# Patient Record
Sex: MALE | Race: WHITE | NOT HISPANIC OR LATINO | Employment: PART TIME | URBAN - METROPOLITAN AREA
[De-identification: names, ages, dates, MRNs, and addresses within clinical notes are randomized per-mention and may not be internally consistent; named-entity substitution may affect disease eponyms.]

---
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
## 2020-12-27 ENCOUNTER — OFFICE VISIT (OUTPATIENT)
Dept: URGENT CARE | Age: 20
End: 2020-12-27
Payer: OTHER GOVERNMENT

## 2020-12-27 VITALS — HEART RATE: 80 BPM | TEMPERATURE: 97.2 F | OXYGEN SATURATION: 98 % | RESPIRATION RATE: 18 BRPM

## 2020-12-27 DIAGNOSIS — Z20.822 EXPOSURE TO COVID-19 VIRUS: Primary | ICD-10-CM

## 2020-12-27 PROCEDURE — U0003 INFECTIOUS AGENT DETECTION BY NUCLEIC ACID (DNA OR RNA); SEVERE ACUTE RESPIRATORY SYNDROME CORONAVIRUS 2 (SARS-COV-2) (CORONAVIRUS DISEASE [COVID-19]), AMPLIFIED PROBE TECHNIQUE, MAKING USE OF HIGH THROUGHPUT TECHNOLOGIES AS DESCRIBED BY CMS-2020-01-R: HCPCS | Performed by: PHYSICIAN ASSISTANT

## 2020-12-27 PROCEDURE — 99213 OFFICE O/P EST LOW 20 MIN: CPT | Performed by: PHYSICIAN ASSISTANT

## 2020-12-29 LAB — SARS-COV-2 RNA SPEC QL NAA+PROBE: NOT DETECTED

## 2022-10-12 ENCOUNTER — OFFICE VISIT (OUTPATIENT)
Dept: URGENT CARE | Facility: CLINIC | Age: 22
End: 2022-10-12
Payer: COMMERCIAL

## 2022-10-12 VITALS
OXYGEN SATURATION: 100 % | RESPIRATION RATE: 14 BRPM | WEIGHT: 170 LBS | HEIGHT: 67 IN | TEMPERATURE: 99.1 F | BODY MASS INDEX: 26.68 KG/M2 | HEART RATE: 78 BPM

## 2022-10-12 DIAGNOSIS — H67.2 OTITIS MEDIA OF LEFT EAR IN DISEASE CLASSIFIED ELSEWHERE: Primary | ICD-10-CM

## 2022-10-12 PROCEDURE — 99213 OFFICE O/P EST LOW 20 MIN: CPT | Performed by: PHYSICIAN ASSISTANT

## 2022-10-12 RX ORDER — AMOXICILLIN AND CLAVULANATE POTASSIUM 875; 125 MG/1; MG/1
1 TABLET, FILM COATED ORAL EVERY 12 HOURS SCHEDULED
Qty: 20 TABLET | Refills: 0 | Status: SHIPPED | OUTPATIENT
Start: 2022-10-12 | End: 2022-10-22

## 2022-10-12 NOTE — PROGRESS NOTES
[facility]        NAME: Ayala Dyson is a 19 y o  male  : 2000    MRN: [id number]  DATE: 2022  TIME: 6:51 PM    Assessment and Plan   Otitis media of left ear in disease classified elsewhere [H67 2]  1  Otitis media of left ear in disease classified elsewhere  amoxicillin-clavulanate (AUGMENTIN) 875-125 mg per tablet         Patient Instructions     Increase fluids, finish antibiotic  Follow up with PCP in 5-7 days  Chief Complaint     Chief Complaint   Patient presents with   • Earache     Pressure and pain in his left ear- pt reports that he has had this for 2 weeks          History of Present Illness       Earache   There is pain in the left ear  This is a new problem  The current episode started 1 to 4 weeks ago  The problem occurs constantly  The problem has been gradually worsening  The maximum temperature recorded prior to his arrival was 100 4 - 100 9 F  The fever has been present for 3 to 4 days  The pain is moderate  Associated symptoms include hearing loss  Pertinent negatives include no abdominal pain, coughing, diarrhea, ear discharge, headaches, neck pain, rash, rhinorrhea, sore throat or vomiting  He has tried nothing for the symptoms  The treatment provided no relief  Review of Systems   Review of Systems   HENT: Positive for ear pain and hearing loss  Negative for ear discharge, rhinorrhea and sore throat  Respiratory: Negative for cough  Gastrointestinal: Negative for abdominal pain, diarrhea and vomiting  Musculoskeletal: Negative for neck pain  Skin: Negative for rash  Neurological: Negative for headaches  All other systems reviewed and are negative          Current Medications       Current Outpatient Medications:   •  amoxicillin-clavulanate (AUGMENTIN) 875-125 mg per tablet, Take 1 tablet by mouth every 12 (twelve) hours for 10 days, Disp: 20 tablet, Rfl: 0  •  dicyclomine (BENTYL) 20 mg tablet, Take 1 tablet by mouth every 6 (six) hours as needed (pain) (Patient not taking: No sig reported), Disp: 10 tablet, Rfl: 0  •  ondansetron (ZOFRAN-ODT) 4 mg disintegrating tablet, Take 1 tablet by mouth every 8 (eight) hours as needed for nausea (Patient not taking: No sig reported), Disp: 10 tablet, Rfl: 0    Current Allergies     Allergies as of 10/12/2022   • (No Known Allergies)            The following portions of the patient's history were reviewed and updated as appropriate: allergies, current medications, past family history, past medical history, past social history, past surgical history and problem list      Past Medical History:   Diagnosis Date   • Asthma        Past Surgical History:   Procedure Laterality Date   • HERNIA REPAIR         No family history on file  Medications have been verified  Objective   Pulse 78   Temp 99 1 °F (37 3 °C)   Resp 14   Ht 5' 7" (1 702 m)   Wt 77 1 kg (170 lb)   SpO2 100%   BMI 26 63 kg/m²   No LMP for male patient  Physical Exam     Physical Exam  Vitals and nursing note reviewed  Constitutional:       Appearance: Normal appearance  He is normal weight  HENT:      Right Ear: Tympanic membrane, ear canal and external ear normal       Left Ear: Ear canal and external ear normal  Tympanic membrane is injected, scarred, erythematous and bulging  Nose: Nose normal    Cardiovascular:      Rate and Rhythm: Normal rate and regular rhythm  Pulses: Normal pulses  Heart sounds: Normal heart sounds  Pulmonary:      Effort: Pulmonary effort is normal       Breath sounds: Normal breath sounds  Lymphadenopathy:      Cervical: Cervical adenopathy (Left anterior) present  Neurological:      Mental Status: He is alert

## 2022-10-24 ENCOUNTER — OFFICE VISIT (OUTPATIENT)
Dept: URGENT CARE | Facility: CLINIC | Age: 22
End: 2022-10-24
Payer: COMMERCIAL

## 2022-10-24 VITALS
BODY MASS INDEX: 26.37 KG/M2 | HEART RATE: 65 BPM | OXYGEN SATURATION: 100 % | SYSTOLIC BLOOD PRESSURE: 141 MMHG | WEIGHT: 168 LBS | TEMPERATURE: 97.6 F | DIASTOLIC BLOOD PRESSURE: 82 MMHG | HEIGHT: 67 IN | RESPIRATION RATE: 16 BRPM

## 2022-10-24 DIAGNOSIS — H66.012 NON-RECURRENT ACUTE SUPPURATIVE OTITIS MEDIA OF LEFT EAR WITH SPONTANEOUS RUPTURE OF TYMPANIC MEMBRANE: Primary | ICD-10-CM

## 2022-10-24 PROCEDURE — 99213 OFFICE O/P EST LOW 20 MIN: CPT | Performed by: NURSE PRACTITIONER

## 2022-10-24 RX ORDER — OFLOXACIN 3 MG/ML
10 SOLUTION AURICULAR (OTIC) DAILY
Qty: 5 ML | Refills: 0 | Status: SHIPPED | OUTPATIENT
Start: 2022-10-24

## 2022-10-24 RX ORDER — AZITHROMYCIN 250 MG/1
TABLET, FILM COATED ORAL
Qty: 6 TABLET | Refills: 0 | Status: SHIPPED | OUTPATIENT
Start: 2022-10-24 | End: 2022-10-28

## 2022-10-24 NOTE — PROGRESS NOTES
[facility]        NAME: Willie Hogue is a 19 y o  male  : 2000    MRN: [id number]  DATE: 2022  TIME: 7:42 PM    Assessment and Plan   Non-recurrent acute suppurative otitis media of left ear with spontaneous rupture of tympanic membrane [H66 012]  1  Non-recurrent acute suppurative otitis media of left ear with spontaneous rupture of tympanic membrane  ofloxacin (FLOXIN) 0 3 % otic solution    azithromycin (ZITHROMAX) 250 mg tablet         Patient Instructions   Azithromycin daily  For 5 days  Ofloxacin 10 drops to the left ear once a day  Do not get any water in the ear for 1 week   Ibuprofen as needed for pain   Follow up with your PCP as needed    Follow up with PCP in 3-5 days  Proceed to  ER if symptoms worsen  Chief Complaint     Chief Complaint   Patient presents with   • Ear Fullness     States he was seen her 2 weeks ago and given abx for OM  Finished abx  Still having some ear fullness decreased hearing  States he is having drainage enough that his hair near the ear is crusty in the morning  History of Present Illness       Patient is a 19year old male presenting with left ear pain, fullness, and drainage  He was seen here 12 days ago and diagnosed with OM of the left ear  He was prescribed Augmentin that he completed  He reports that during the ABX course the ear started with discharge  Denies fever, chills, sore throat, or cough  Ear Fullness   Associated symptoms include ear discharge and hearing loss (muffled hearing)  Review of Systems   Review of Systems   Constitutional: Negative for activity change, chills and fever  HENT: Positive for ear discharge, ear pain and hearing loss (muffled hearing)  Negative for congestion, sinus pressure and sinus pain            Current Medications       Current Outpatient Medications:   •  azithromycin (ZITHROMAX) 250 mg tablet, Take 2 tablets today then 1 tablet daily x 4 days, Disp: 6 tablet, Rfl: 0  • ofloxacin (FLOXIN) 0 3 % otic solution, Administer 10 drops into the left ear daily, Disp: 5 mL, Rfl: 0  •  dicyclomine (BENTYL) 20 mg tablet, Take 1 tablet by mouth every 6 (six) hours as needed (pain) (Patient not taking: No sig reported), Disp: 10 tablet, Rfl: 0  •  ondansetron (ZOFRAN-ODT) 4 mg disintegrating tablet, Take 1 tablet by mouth every 8 (eight) hours as needed for nausea (Patient not taking: No sig reported), Disp: 10 tablet, Rfl: 0    Current Allergies     Allergies as of 10/24/2022   • (No Known Allergies)            The following portions of the patient's history were reviewed and updated as appropriate: allergies, current medications, past family history, past medical history, past social history, past surgical history and problem list      Past Medical History:   Diagnosis Date   • Asthma        Past Surgical History:   Procedure Laterality Date   • HERNIA REPAIR         No family history on file  Medications have been verified  Objective   /82   Pulse 65   Temp 97 6 °F (36 4 °C) (Temporal)   Resp 16   Ht 5' 7" (1 702 m)   Wt 76 2 kg (168 lb)   SpO2 100%   BMI 26 31 kg/m²        Physical Exam     Physical Exam  Constitutional:       General: He is awake  HENT:      Head: Normocephalic  Right Ear: Hearing, tympanic membrane, ear canal and external ear normal       Left Ear: Ear canal and external ear normal  Decreased hearing noted  Drainage present  Tympanic membrane is erythematous  Cardiovascular:      Rate and Rhythm: Normal rate  Pulmonary:      Effort: Pulmonary effort is normal    Skin:     General: Skin is warm  Neurological:      General: No focal deficit present  Mental Status: He is alert and oriented to person, place, and time  Psychiatric:         Behavior: Behavior is cooperative

## 2022-11-09 ENCOUNTER — OFFICE VISIT (OUTPATIENT)
Dept: URGENT CARE | Facility: CLINIC | Age: 22
End: 2022-11-09

## 2022-11-09 VITALS — OXYGEN SATURATION: 98 % | TEMPERATURE: 98.5 F | RESPIRATION RATE: 16 BRPM | HEART RATE: 76 BPM

## 2022-11-09 DIAGNOSIS — H60.392 OTHER INFECTIVE ACUTE OTITIS EXTERNA OF LEFT EAR: Primary | ICD-10-CM

## 2022-11-09 RX ORDER — OFLOXACIN 3 MG/ML
10 SOLUTION AURICULAR (OTIC) DAILY
Qty: 5 ML | Refills: 0 | Status: SHIPPED | OUTPATIENT
Start: 2022-11-09

## 2022-11-10 NOTE — PROGRESS NOTES
[facility]        NAME: Arina Beck is a 19 y o  male  : 2000    MRN: [id number]  DATE: 2022  TIME: 7:31 PM    Assessment and Plan   Other infective acute otitis externa of left ear [H60 392]  1  Other infective acute otitis externa of left ear  ofloxacin (FLOXIN) 0 3 % otic solution         Patient Instructions       Follow up with PCP in 3-5 days  Proceed to  ER if symptoms worsen  Chief Complaint     Chief Complaint   Patient presents with   • Earache     L ear states he was seen here on 10/12 for infection  Given Augmentin  Finished po abx but still having issues  Returned and given ear drops which he took for 5 or 6 days  Improved so he stopped the drops then sx returned so he took 2 more days of gtts  Last dose on   Still having some discomfort  Also states he was trying to get the water out of his ear after a shower a couple days ago and saw blood  History of Present Illness       Patient is here for evaluation of left ear pain  Patient has been having ongoing problems with the left ear  He did start the antibiotic drops to the ear in use of for 5 days and felt like it was coming back so he use the drops 2 days about 2 days after he stopped them  He still having some discharge and discomfort in the left ear  Patient states he was trying to get some water out of the ear when he noticed some bleeding from the ear  He denies any pop or or pain  Earache   Associated symptoms include ear discharge  Pertinent negatives include no rhinorrhea or sore throat  Review of Systems   Review of Systems   Constitutional: Negative  HENT: Positive for ear discharge and ear pain  Negative for congestion, postnasal drip, rhinorrhea, sinus pressure, sinus pain, sore throat and trouble swallowing  Eyes: Negative  Respiratory: Negative            Current Medications       Current Outpatient Medications:   •  ofloxacin (FLOXIN) 0 3 % otic solution, Administer 10 drops into the left ear daily, Disp: 5 mL, Rfl: 0  •  dicyclomine (BENTYL) 20 mg tablet, Take 1 tablet by mouth every 6 (six) hours as needed (pain) (Patient not taking: No sig reported), Disp: 10 tablet, Rfl: 0  •  ondansetron (ZOFRAN-ODT) 4 mg disintegrating tablet, Take 1 tablet by mouth every 8 (eight) hours as needed for nausea (Patient not taking: No sig reported), Disp: 10 tablet, Rfl: 0    Current Allergies     Allergies as of 11/09/2022   • (No Known Allergies)            The following portions of the patient's history were reviewed and updated as appropriate: allergies, current medications, past family history, past medical history, past social history, past surgical history and problem list      Past Medical History:   Diagnosis Date   • Asthma        Past Surgical History:   Procedure Laterality Date   • HERNIA REPAIR         No family history on file  Medications have been verified  Objective   Pulse 76   Temp 98 5 °F (36 9 °C) (Temporal)   Resp 16   SpO2 98%   No LMP for male patient  Physical Exam     Physical Exam  Vitals and nursing note reviewed  Constitutional:       General: He is not in acute distress  Appearance: Normal appearance  He is well-developed  He is not ill-appearing, toxic-appearing or diaphoretic  HENT:      Head: Normocephalic and atraumatic  Right Ear: Tympanic membrane and ear canal normal       Left Ear: Tympanic membrane normal  Laceration (Small open wound on the top of the ear canal   No active bleeding ), drainage and swelling present  Tympanic membrane is not perforated, erythematous, retracted or bulging  Eyes:      Extraocular Movements: Extraocular movements intact  Conjunctiva/sclera: Conjunctivae normal       Pupils: Pupils are equal, round, and reactive to light  Skin:     General: Skin is warm and dry  Neurological:      General: No focal deficit present        Mental Status: He is alert and oriented to person, place, and time  Psychiatric:         Mood and Affect: Mood normal          Behavior: Behavior normal          Thought Content:  Thought content normal          Judgment: Judgment normal

## 2022-11-10 NOTE — PATIENT INSTRUCTIONS
Continue the ear drops for 10 days    Follow-up with your primary care physician or ENT if symptoms persist    Avoid getting water in the ear while undergoing treatment

## 2022-11-27 ENCOUNTER — OFFICE VISIT (OUTPATIENT)
Dept: URGENT CARE | Facility: CLINIC | Age: 22
End: 2022-11-27

## 2022-11-27 VITALS
OXYGEN SATURATION: 98 % | HEART RATE: 88 BPM | RESPIRATION RATE: 16 BRPM | BODY MASS INDEX: 26.31 KG/M2 | WEIGHT: 168 LBS | TEMPERATURE: 97.5 F

## 2022-11-27 DIAGNOSIS — R06.2 WHEEZING: Primary | ICD-10-CM

## 2022-11-27 RX ORDER — ALBUTEROL SULFATE 90 UG/1
2 AEROSOL, METERED RESPIRATORY (INHALATION) EVERY 6 HOURS PRN
Qty: 18 G | Refills: 0 | Status: SHIPPED | OUTPATIENT
Start: 2022-11-27

## 2022-11-27 NOTE — PROGRESS NOTES
[facility]        NAME: Shial Quiñones is a 19 y o  male  : 2000    MRN: [id number]  DATE: 2022  TIME: 11:09 AM    Assessment and Plan   Wheezing [R06 2]  1  Wheezing  albuterol (Ventolin HFA) 90 mcg/act inhaler            Patient Instructions       Follow up with PCP in 3-5 days  Proceed to  ER if symptoms worsen  Chief Complaint     Chief Complaint   Patient presents with   • Shortness of Breath     Pt is reporting issues with asthma- trouble sleeping and breathing at night with ongoing cough  History of Present Illness       Patient is a 22-year-old male presenting with cough and wheeze for the past 2-3 days  Reports history of asthma but has not had any medication in several years  The cough started on his roommates turned the heat on  No over-the-counter medications  Review of Systems   Review of Systems   Constitutional: Negative for activity change, chills, fatigue and fever  HENT: Negative for congestion, ear pain, rhinorrhea, sinus pain and sore throat  Respiratory: Positive for cough and wheezing  Negative for shortness of breath  Skin: Negative for rash  Neurological: Negative for headaches           Current Medications       Current Outpatient Medications:   •  albuterol (Ventolin HFA) 90 mcg/act inhaler, Inhale 2 puffs every 6 (six) hours as needed for wheezing, Disp: 18 g, Rfl: 0  •  dicyclomine (BENTYL) 20 mg tablet, Take 1 tablet by mouth every 6 (six) hours as needed (pain), Disp: 10 tablet, Rfl: 0  •  ofloxacin (FLOXIN) 0 3 % otic solution, Administer 10 drops into the left ear daily, Disp: 5 mL, Rfl: 0  •  ondansetron (ZOFRAN-ODT) 4 mg disintegrating tablet, Take 1 tablet by mouth every 8 (eight) hours as needed for nausea, Disp: 10 tablet, Rfl: 0    Current Allergies     Allergies as of 2022   • (No Known Allergies)            The following portions of the patient's history were reviewed and updated as appropriate: allergies, current medications, past family history, past medical history, past social history, past surgical history and problem list      Past Medical History:   Diagnosis Date   • Asthma        Past Surgical History:   Procedure Laterality Date   • HERNIA REPAIR         No family history on file  Medications have been verified  Objective   Pulse 88   Temp 97 5 °F (36 4 °C)   Resp 16   Wt 76 2 kg (168 lb)   SpO2 98%   BMI 26 31 kg/m²        Physical Exam     Physical Exam  Vitals reviewed  Constitutional:       General: He is awake  He is not in acute distress  Appearance: Normal appearance  He is normal weight  HENT:      Head: Normocephalic  Right Ear: Hearing normal       Left Ear: Hearing normal       Nose: Nose normal    Cardiovascular:      Rate and Rhythm: Normal rate and regular rhythm  Heart sounds: Normal heart sounds, S1 normal and S2 normal    Pulmonary:      Effort: Pulmonary effort is normal       Breath sounds: Examination of the right-upper field reveals wheezing  Examination of the left-upper field reveals wheezing  Wheezing (end expiratory) present  No decreased breath sounds, rhonchi or rales  Skin:     General: Skin is warm and moist    Neurological:      General: No focal deficit present  Mental Status: He is alert, oriented to person, place, and time and easily aroused  Psychiatric:         Behavior: Behavior is cooperative

## 2023-01-23 ENCOUNTER — OFFICE VISIT (OUTPATIENT)
Dept: URGENT CARE | Facility: CLINIC | Age: 23
End: 2023-01-23

## 2023-01-23 VITALS — RESPIRATION RATE: 16 BRPM | TEMPERATURE: 97.8 F | HEART RATE: 81 BPM | OXYGEN SATURATION: 99 %

## 2023-01-23 DIAGNOSIS — H66.001 ACUTE SUPPURATIVE OTITIS MEDIA OF RIGHT EAR WITHOUT SPONTANEOUS RUPTURE OF TYMPANIC MEMBRANE, RECURRENCE NOT SPECIFIED: Primary | ICD-10-CM

## 2023-01-23 DIAGNOSIS — H61.21 IMPACTED CERUMEN OF RIGHT EAR: ICD-10-CM

## 2023-01-23 DIAGNOSIS — H60.501 ACUTE OTITIS EXTERNA OF RIGHT EAR, UNSPECIFIED TYPE: ICD-10-CM

## 2023-01-23 RX ORDER — CIPROFLOXACIN AND DEXAMETHASONE 3; 1 MG/ML; MG/ML
4 SUSPENSION/ DROPS AURICULAR (OTIC) 2 TIMES DAILY
Qty: 7.5 ML | Refills: 0 | Status: SHIPPED | OUTPATIENT
Start: 2023-01-23

## 2023-01-23 RX ORDER — AMOXICILLIN 875 MG/1
875 TABLET, COATED ORAL 2 TIMES DAILY
Qty: 14 TABLET | Refills: 0 | Status: SHIPPED | OUTPATIENT
Start: 2023-01-23 | End: 2023-01-30

## 2023-01-23 NOTE — PATIENT INSTRUCTIONS
--Take oral antibiotic, instill antibiotic ear drops as prescribed  --Motrin as needed  --Keep water out of ears  --Seek re-evaluation if no improvement/worsening over the next 48-72 hours

## 2023-01-23 NOTE — PROGRESS NOTES
[facility]        NAME: Alyssia Biswas is a 23 y o  male  : 2000    MRN: [id number]  DATE: 2023  TIME: 12:36 PM    Assessment and Plan   Acute suppurative otitis media of right ear without spontaneous rupture of tympanic membrane, recurrence not specified [H66 001]  1  Acute suppurative otitis media of right ear without spontaneous rupture of tympanic membrane, recurrence not specified  amoxicillin (AMOXIL) 875 mg tablet      2  Acute otitis externa of right ear, unspecified type  ciprofloxacin-dexamethasone (CIPRODEX) otic suspension      3  Impacted cerumen of right ear              Patient Instructions     --Take oral antibiotic, instill antibiotic ear drops as prescribed  --Motrin as needed  --Keep water out of ears  --Seek re-evaluation if no improvement/worsening over the next 48-72 hours  Chief Complaint     Chief Complaint   Patient presents with   • Earache      R ear pain started 4 days ago  No other sx  History of Present Illness       Here with girlfriend for complaints of right ear pain, decreased hearing x 4 days  No associated fever, URI symptoms, otorrhea  Had previous ear infection in his left ear  No OTC/home treatments  Review of Systems   Review of Systems   Constitutional: Negative for fever  HENT: Positive for ear pain  Negative for ear discharge and rhinorrhea  Respiratory: Negative for cough  Neurological: Negative for headaches           Current Medications       Current Outpatient Medications:   •  albuterol (Ventolin HFA) 90 mcg/act inhaler, Inhale 2 puffs every 6 (six) hours as needed for wheezing, Disp: 18 g, Rfl: 0  •  amoxicillin (AMOXIL) 875 mg tablet, Take 1 tablet (875 mg total) by mouth 2 (two) times a day for 7 days, Disp: 14 tablet, Rfl: 0  •  ciprofloxacin-dexamethasone (CIPRODEX) otic suspension, Administer 4 drops to the right ear 2 (two) times a day, Disp: 7 5 mL, Rfl: 0  •  dicyclomine (BENTYL) 20 mg tablet, Take 1 tablet by mouth every 6 (six) hours as needed (pain) (Patient not taking: Reported on 1/23/2023), Disp: 10 tablet, Rfl: 0  •  ondansetron (ZOFRAN-ODT) 4 mg disintegrating tablet, Take 1 tablet by mouth every 8 (eight) hours as needed for nausea (Patient not taking: Reported on 1/23/2023), Disp: 10 tablet, Rfl: 0    Current Allergies     Allergies as of 01/23/2023   • (No Known Allergies)            The following portions of the patient's history were reviewed and updated as appropriate: allergies, current medications, past family history, past medical history, past social history, past surgical history and problem list      Past Medical History:   Diagnosis Date   • Asthma        Past Surgical History:   Procedure Laterality Date   • HERNIA REPAIR         No family history on file  Medications have been verified  Objective   Pulse 81   Temp 97 8 °F (36 6 °C) (Temporal)   Resp 16   SpO2 99%   No LMP for male patient  Physical Exam     Physical Exam  Constitutional:       General: He is not in acute distress  Appearance: He is not ill-appearing, toxic-appearing or diaphoretic  HENT:      Right Ear: There is impacted cerumen  Left Ear: Tympanic membrane and ear canal normal  There is no impacted cerumen  Ears:      Comments: Right ear canal 100% occluded distally with cerumen  S/p removal after 5 minutes of warm water lavage, distal canal noted to be erythematous, mildly swollen, TM erythematous with mild bulge  No otorrhea  Mouth/Throat:      Pharynx: No oropharyngeal exudate or posterior oropharyngeal erythema  Pulmonary:      Effort: Pulmonary effort is normal    Lymphadenopathy:      Cervical: No cervical adenopathy  Neurological:      Mental Status: He is alert     Psychiatric:         Mood and Affect: Mood normal          Ear cerumen removal    Date/Time: 1/23/2023 12:40 PM  Performed by: MARLEY Melendrez  Authorized by: MARLEY Melendrez   Universal Protocol:  Consent: Verbal consent obtained  Risks and benefits: risks, benefits and alternatives were discussed  Consent given by: patient  Patient understanding: patient states understanding of the procedure being performed  Patient consent: the patient's understanding of the procedure matches consent given  Procedure consent: procedure consent matches procedure scheduled  Required items: required blood products, implants, devices, and special equipment available  Patient identity confirmed: verbally with patient      Patient location:  Clinic  Indications / Diagnosis:  Pain, decreased hearing  Procedure details:     Location:  R ear    Procedure type: irrigation only      Approach:  Natural orifice    Visualization (free text):  SEE EXAM  Post-procedure details:     Complication:  None    Hearing quality:  Improved    Patient tolerance of procedure:   Tolerated well, no immediate complications

## 2023-02-12 ENCOUNTER — OFFICE VISIT (OUTPATIENT)
Dept: URGENT CARE | Facility: CLINIC | Age: 23
End: 2023-02-12

## 2023-02-12 VITALS
BODY MASS INDEX: 27.31 KG/M2 | WEIGHT: 174 LBS | TEMPERATURE: 98.1 F | RESPIRATION RATE: 16 BRPM | DIASTOLIC BLOOD PRESSURE: 78 MMHG | OXYGEN SATURATION: 99 % | HEIGHT: 67 IN | HEART RATE: 70 BPM | SYSTOLIC BLOOD PRESSURE: 129 MMHG

## 2023-02-12 DIAGNOSIS — H92.01 RIGHT EAR PAIN: Primary | ICD-10-CM

## 2023-02-12 DIAGNOSIS — H61.21 IMPACTED CERUMEN OF RIGHT EAR: ICD-10-CM

## 2023-02-12 NOTE — PATIENT INSTRUCTIONS
--Use Rx ear drops (prescribed previously) for an additional 3-5 days as precaution  --Follow-up with ENT for any ongoing/recurrent issues

## 2023-02-12 NOTE — PROGRESS NOTES
[facility] [facility]        NAME: Kylah Gill is a 23 y o  male  : 2000    MRN: [id number]  DATE: 2023  TIME: 12:04 PM    Assessment and Plan   Right ear pain [H92 01]  1  Right ear pain  Ambulatory Referral to Otolaryngology      2  Impacted cerumen of right ear              Patient Instructions     --Use Rx ear drops (prescribed previously) for an additional 3-5 days as precaution  --Follow-up with ENT for any ongoing/recurrent issues    Chief Complaint     Chief Complaint   Patient presents with   • Earache     Pt  C/o right ear pain on and off x weeks, got worse within the past couple days, throbbing pain  Pt  Using ears drops that were rx'd  History of Present Illness       Here with complaints of right ear pain, decreased hearing for a couple days  No fever, cold symptoms  Seen here on  for right ear pain, at which time wax was removed from ear and he was treated with oral antibiotic and antibiotic ear drops  Completed both, as instructed  No known trauma  No otorrhea  Review of Systems   Review of Systems   Constitutional: Negative for fever  HENT: Positive for ear pain and hearing loss  Negative for ear discharge and rhinorrhea  Respiratory: Negative for cough            Current Medications       Current Outpatient Medications:   •  ciprofloxacin-dexamethasone (CIPRODEX) otic suspension, Administer 4 drops to the right ear 2 (two) times a day, Disp: 7 5 mL, Rfl: 0  •  albuterol (Ventolin HFA) 90 mcg/act inhaler, Inhale 2 puffs every 6 (six) hours as needed for wheezing, Disp: 18 g, Rfl: 0  •  dicyclomine (BENTYL) 20 mg tablet, Take 1 tablet by mouth every 6 (six) hours as needed (pain) (Patient not taking: Reported on 2023), Disp: 10 tablet, Rfl: 0  •  ondansetron (ZOFRAN-ODT) 4 mg disintegrating tablet, Take 1 tablet by mouth every 8 (eight) hours as needed for nausea (Patient not taking: Reported on 2023), Disp: 10 tablet, Rfl: 0    Current Allergies     Allergies as of 02/12/2023   • (No Known Allergies)            The following portions of the patient's history were reviewed and updated as appropriate: allergies, current medications, past family history, past medical history, past social history, past surgical history and problem list      Past Medical History:   Diagnosis Date   • Asthma        Past Surgical History:   Procedure Laterality Date   • HERNIA REPAIR         No family history on file  Medications have been verified  Objective   /78   Pulse 70   Temp 98 1 °F (36 7 °C)   Resp 16   Ht 5' 7" (1 702 m)   Wt 78 9 kg (174 lb)   SpO2 99%   BMI 27 25 kg/m²   No LMP for male patient  Physical Exam     Physical Exam  HENT:      Right Ear: Tympanic membrane normal  There is impacted cerumen  Left Ear: Tympanic membrane and ear canal normal  There is no impacted cerumen  Ears:      Comments: Right canal with mild erythema, no swelling or otorrhea  TM occluded with thin layer of cerumen which was fully removed after 5 minutes of warm water lavage which patient tolerated adequately  S/p removal, TM noted to be dull grey with minimal bulge  Pulmonary:      Effort: Pulmonary effort is normal    Neurological:      Mental Status: He is alert  Psychiatric:         Mood and Affect: Mood normal        Ear cerumen removal    Date/Time: 2/12/2023 12:08 PM  Performed by: MARLEY Jones  Authorized by: MARLEY Jones   Universal Protocol:  Consent: Verbal consent obtained    Risks and benefits: risks, benefits and alternatives were discussed  Consent given by: patient  Patient understanding: patient states understanding of the procedure being performed  Patient consent: the patient's understanding of the procedure matches consent given  Procedure consent: procedure consent matches procedure scheduled  Patient identity confirmed: verbally with patient      Patient location:  Clinic  Indications / Diagnosis:  Pain, decreased hearing  Procedure details:     Location:  R ear    Procedure type: irrigation only      Approach:  Natural orifice    Visualization (free text):  SEE EXAM  Post-procedure details:     Complication:  None    Hearing quality:  Improved    Patient tolerance of procedure:   Tolerated well, no immediate complications

## 2023-03-09 ENCOUNTER — OFFICE VISIT (OUTPATIENT)
Dept: FAMILY MEDICINE CLINIC | Facility: CLINIC | Age: 23
End: 2023-03-09

## 2023-03-09 VITALS
HEART RATE: 75 BPM | WEIGHT: 178 LBS | OXYGEN SATURATION: 99 % | SYSTOLIC BLOOD PRESSURE: 120 MMHG | HEIGHT: 67 IN | BODY MASS INDEX: 27.94 KG/M2 | DIASTOLIC BLOOD PRESSURE: 80 MMHG

## 2023-03-09 DIAGNOSIS — R06.2 WHEEZING: ICD-10-CM

## 2023-03-09 DIAGNOSIS — Z13.0 SCREENING FOR DEFICIENCY ANEMIA: ICD-10-CM

## 2023-03-09 DIAGNOSIS — K50.00 CROHN'S DISEASE OF ILEUM WITHOUT COMPLICATION (HCC): ICD-10-CM

## 2023-03-09 DIAGNOSIS — Z13.1 SCREENING FOR DIABETES MELLITUS: ICD-10-CM

## 2023-03-09 DIAGNOSIS — Z13.220 SCREENING CHOLESTEROL LEVEL: ICD-10-CM

## 2023-03-09 DIAGNOSIS — Z00.00 ENCOUNTER FOR PREVENTATIVE ADULT HEALTH CARE EXAMINATION: Primary | ICD-10-CM

## 2023-03-09 DIAGNOSIS — F90.2 ATTENTION DEFICIT HYPERACTIVITY DISORDER (ADHD), COMBINED TYPE: ICD-10-CM

## 2023-03-09 PROBLEM — K43.9 EPIGASTRIC HERNIA: Status: ACTIVE | Noted: 2018-03-16

## 2023-03-09 PROBLEM — M41.9 SCOLIOSIS OF THORACIC SPINE: Status: ACTIVE | Noted: 2017-04-17

## 2023-03-09 RX ORDER — ALBUTEROL SULFATE 90 UG/1
2 AEROSOL, METERED RESPIRATORY (INHALATION) EVERY 6 HOURS PRN
Qty: 18 G | Refills: 1 | Status: SHIPPED | OUTPATIENT
Start: 2023-03-09

## 2023-03-09 NOTE — PROGRESS NOTES
New Patient Outpatient Note    HPI:     Floyd Haile, 23 y o  male  presents today as a new patient and to establish care  Patient is interested in a refill for his albuterol  He has mild intermittent asthma and only uses the albuterol as needed for bronchitis-like symptoms/wheezing  He typically is triggered by dry heat which over the winter has increased the frequency of use  He was previously on montelukast, but has not had medication for over the past several years  Patient does admit to history of Crohn's disease and suspected ADHD symptoms  He is not on any current medication or maintenance medications for either  He does admit as well as his significant other who comes with him today that he can sometimes be hyper focused on a particular project for hours or have very fleeting thoughts and be distracted  Lastly he is interested in having his ears reevaluated  He had a positive ear infection about a month ago, and has mild fullness on the right side still  He has mild decreased hearing on right side      Family Hx  UTD in chart    Past Medical History:   Diagnosis Date   • Asthma         Past Surgical History:   Procedure Laterality Date   • HERNIA REPAIR            Current Outpatient Medications:   •  albuterol (Ventolin HFA) 90 mcg/act inhaler, Inhale 2 puffs every 6 (six) hours as needed for wheezing, Disp: 18 g, Rfl: 1  •  ciprofloxacin-dexamethasone (CIPRODEX) otic suspension, Administer 4 drops to the right ear 2 (two) times a day, Disp: 7 5 mL, Rfl: 0     SOCIAL:   Rent/Own: Renting  Currently living with: 2 roomates  Stable food: Yes  Safe At Home: Yes  Hobbies:  Fishing, video games  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  Restriction to medical procedures: None    SEXUAL HISTORY:   Preference: Women  Sexually Active:  Yes  Birth Control:  OCPs    Psychological History  Psychiatric history: ADHD, anxiety  History of inpatient:  None  Current Therapy/ Provider: none  Current Medications: None    Substance History  Smoking: Vaping currently  Alcohol Use: former, no longer acitve  Substance use:  None    ROS:   Review of Systems   Constitutional: Negative for chills, fever and unexpected weight change  Occasional night sweats   HENT: Positive for congestion, ear pain, postnasal drip and rhinorrhea  Negative for ear discharge, sinus pressure, sinus pain and sore throat  Eyes: Positive for visual disturbance (history of glasses)  Respiratory: Positive for cough (vaping) and shortness of breath ( asthma)  Negative for chest tightness  Cardiovascular: Negative for chest pain and palpitations  Gastrointestinal: Negative for abdominal pain, constipation, diarrhea, nausea and vomiting  Genitourinary: Negative for dysuria and frequency  Skin: Negative for rash and wound  Neurological: Negative for dizziness, light-headedness and headaches  Psychiatric/Behavioral: Negative for self-injury and suicidal ideas  OBJECTIVE  Vitals:    03/09/23 1256   BP: 120/80   Pulse: 75   SpO2: 99%      Physical Exam  Constitutional:       General: He is not in acute distress  Appearance: Normal appearance  He is not ill-appearing, toxic-appearing or diaphoretic  HENT:      Head: Normocephalic and atraumatic  Right Ear: Tympanic membrane, ear canal and external ear normal  There is no impacted cerumen  Left Ear: Tympanic membrane, ear canal and external ear normal  There is no impacted cerumen  Nose: Nose normal  No congestion or rhinorrhea  Mouth/Throat:      Mouth: Mucous membranes are moist       Pharynx: Oropharynx is clear  No oropharyngeal exudate or posterior oropharyngeal erythema  Eyes:      General:         Right eye: No discharge  Left eye: No discharge  Extraocular Movements: Extraocular movements intact  Pupils: Pupils are equal, round, and reactive to light     Cardiovascular:      Rate and Rhythm: Normal rate and regular rhythm  Heart sounds: Normal heart sounds  No murmur heard  No friction rub  No gallop  Pulmonary:      Effort: Pulmonary effort is normal  No respiratory distress  Breath sounds: Normal breath sounds  No stridor  No wheezing, rhonchi or rales  Abdominal:      General: Bowel sounds are normal  There is no distension  Palpations: Abdomen is soft  Tenderness: There is no abdominal tenderness  Musculoskeletal:      Cervical back: Neck supple  No tenderness  Lymphadenopathy:      Cervical: No cervical adenopathy  Skin:     General: Skin is warm  Capillary Refill: Capillary refill takes less than 2 seconds  Neurological:      Mental Status: He is alert  Sensory: No sensory deficit ( Light touch present in all 4 extremities)  Motor: No weakness ( 5/5 in all 4 extremities)  Deep Tendon Reflexes: Reflexes normal ( 2/4, intact, C5, C6, L4, S1)  ASSESSMENT AND PLAN   Teri Bradford was seen today for establish care  Diagnoses and all orders for this visit:    Encounter for preventative adult health care examination  Screening for deficiency anemia  Screening for diabetes mellitus  Screening cholesterol level  Patient has not been evalujated in many years  Will obtain baseline labs for treatment or comparison of labs in the future  Overall the patient's vitals are stable and his BMI is likely slightly lower due to muscular build  Will evaluate cholesterol, kidney/liver function, glucose, and blood counts  -     CBC and differential; Future  -     Comprehensive metabolic panel; Future  -     Lipid panel; Future    Wheezing  Patient has dry heat induced asthma and can sometimes have exacerbations with increased exertion  Refill of albuterol sent to pharmacy at patient request    -     albuterol (Ventolin HFA) 90 mcg/act inhaler; Inhale 2 puffs every 6 (six) hours as needed for wheezing    [facility] ([facility] )  History of crohn's disease  Recommended follow up at some point with GI  Patient and significant other opted to wait on referral and follow up in a few months  Attention deficit hyperactivity disorder (ADHD), combined type  Offered Wellbutrin and consideration for referral to psychiatry  I informed him that I will unlikely be starting ADHD medication, but if he would like further diagnostic clarity, I would place referral   He would like to hold off on treatment or referral at this time                Tricia Stevenson DO  [facility]  3/10/2023 7:49 AM

## 2023-08-17 DIAGNOSIS — R06.2 WHEEZING: ICD-10-CM

## 2023-08-17 RX ORDER — ALBUTEROL SULFATE 90 UG/1
2 AEROSOL, METERED RESPIRATORY (INHALATION) EVERY 6 HOURS PRN
Qty: 18 G | Refills: 0 | Status: SHIPPED | OUTPATIENT
Start: 2023-08-17

## 2023-09-15 DIAGNOSIS — R06.2 WHEEZING: ICD-10-CM

## 2023-09-15 RX ORDER — ALBUTEROL SULFATE 90 UG/1
2 AEROSOL, METERED RESPIRATORY (INHALATION) EVERY 6 HOURS PRN
Qty: 18 G | Refills: 0 | Status: SHIPPED | OUTPATIENT
Start: 2023-09-15

## 2023-09-18 ENCOUNTER — PATIENT MESSAGE (OUTPATIENT)
Dept: FAMILY MEDICINE CLINIC | Facility: CLINIC | Age: 23
End: 2023-09-18

## 2023-09-18 DIAGNOSIS — J45.21 MILD INTERMITTENT ASTHMA WITH ACUTE EXACERBATION: ICD-10-CM

## 2023-09-18 DIAGNOSIS — R06.2 WHEEZING: Primary | ICD-10-CM

## 2023-09-19 RX ORDER — MONTELUKAST SODIUM 10 MG/1
10 TABLET ORAL
Qty: 30 TABLET | Refills: 0 | Status: SHIPPED | OUTPATIENT
Start: 2023-09-19

## 2023-10-09 ENCOUNTER — PATIENT MESSAGE (OUTPATIENT)
Dept: FAMILY MEDICINE CLINIC | Facility: CLINIC | Age: 23
End: 2023-10-09

## 2023-10-09 DIAGNOSIS — J45.21 MILD INTERMITTENT ASTHMA WITH ACUTE EXACERBATION: ICD-10-CM

## 2023-10-09 DIAGNOSIS — R06.2 WHEEZING: ICD-10-CM

## 2023-10-12 RX ORDER — MONTELUKAST SODIUM 10 MG/1
10 TABLET ORAL
Qty: 90 TABLET | Refills: 0 | Status: SHIPPED | OUTPATIENT
Start: 2023-10-12

## 2023-11-12 DIAGNOSIS — R06.2 WHEEZING: ICD-10-CM

## 2023-11-13 RX ORDER — ALBUTEROL SULFATE 90 UG/1
2 AEROSOL, METERED RESPIRATORY (INHALATION) EVERY 6 HOURS PRN
Qty: 18 G | Refills: 1 | Status: SHIPPED | OUTPATIENT
Start: 2023-11-13

## 2024-01-08 DIAGNOSIS — J45.21 MILD INTERMITTENT ASTHMA WITH ACUTE EXACERBATION: ICD-10-CM

## 2024-01-08 DIAGNOSIS — R06.2 WHEEZING: ICD-10-CM

## 2024-01-08 RX ORDER — MONTELUKAST SODIUM 10 MG/1
10 TABLET ORAL
Qty: 90 TABLET | Refills: 1 | Status: SHIPPED | OUTPATIENT
Start: 2024-01-08

## 2024-02-12 DIAGNOSIS — R06.2 WHEEZING: ICD-10-CM

## 2024-02-12 RX ORDER — ALBUTEROL SULFATE 90 UG/1
2 AEROSOL, METERED RESPIRATORY (INHALATION) EVERY 6 HOURS PRN
Qty: 18 G | Refills: 0 | Status: SHIPPED | OUTPATIENT
Start: 2024-02-12

## 2024-03-31 DIAGNOSIS — R06.2 WHEEZING: ICD-10-CM

## 2024-04-02 RX ORDER — ALBUTEROL SULFATE 90 UG/1
2 AEROSOL, METERED RESPIRATORY (INHALATION) EVERY 6 HOURS PRN
Qty: 18 G | Refills: 0 | Status: SHIPPED | OUTPATIENT
Start: 2024-04-02

## 2024-04-02 NOTE — TELEPHONE ENCOUNTER
Patient recently obtained albuterol inhaler about 1 month ago.  I will send another refill just in case he needs it in 2 locations if he requests this again especially within the next 3 months, I will need him to return to the office since he is using albuterol too frequently.

## 2024-04-22 DIAGNOSIS — R06.2 WHEEZING: ICD-10-CM

## 2024-04-22 RX ORDER — ALBUTEROL SULFATE 90 UG/1
2 AEROSOL, METERED RESPIRATORY (INHALATION) EVERY 6 HOURS PRN
Qty: 6.7 G | Refills: 0 | Status: SHIPPED | OUTPATIENT
Start: 2024-04-22

## 2024-06-07 ENCOUNTER — OFFICE VISIT (OUTPATIENT)
Dept: FAMILY MEDICINE CLINIC | Facility: CLINIC | Age: 24
End: 2024-06-07

## 2024-06-07 VITALS
DIASTOLIC BLOOD PRESSURE: 72 MMHG | SYSTOLIC BLOOD PRESSURE: 110 MMHG | BODY MASS INDEX: 28.72 KG/M2 | WEIGHT: 183 LBS | HEART RATE: 66 BPM | HEIGHT: 67 IN | OXYGEN SATURATION: 98 %

## 2024-06-07 DIAGNOSIS — Z13.1 SCREENING FOR DIABETES MELLITUS: ICD-10-CM

## 2024-06-07 DIAGNOSIS — R20.9 COLD EXTREMITIES: ICD-10-CM

## 2024-06-07 DIAGNOSIS — Z00.00 ENCOUNTER FOR PREVENTATIVE ADULT HEALTH CARE EXAMINATION: Primary | ICD-10-CM

## 2024-06-07 DIAGNOSIS — K50.00 CROHN'S DISEASE OF ILEUM WITHOUT COMPLICATION (HCC): ICD-10-CM

## 2024-06-07 DIAGNOSIS — Z13.0 SCREENING FOR DEFICIENCY ANEMIA: ICD-10-CM

## 2024-06-07 DIAGNOSIS — J45.20 MILD INTERMITTENT ASTHMA WITHOUT COMPLICATION: ICD-10-CM

## 2024-06-07 DIAGNOSIS — Z13.29 SCREENING FOR THYROID DISORDER: ICD-10-CM

## 2024-06-07 DIAGNOSIS — Z13.220 SCREENING CHOLESTEROL LEVEL: ICD-10-CM

## 2024-06-07 NOTE — PROGRESS NOTES
Adult Annual Physical  Name: Diaz Albert      : 2000      MRN: [id number]  Encounter Provider: Tano Anthony DO  Encounter Date: 2024   Encounter department: [facility]    Assessment & Plan     Encounter for preventative adult health care examination  Screening for deficiency anemia  Screening for diabetes mellitus  Screening cholesterol level  Screening for thyroid disorder  Patient presents today for follow-up physical examination.  Will obtain labs to review cholesterol, kidney function, liver function, electrolytes, fasting sugar, thyroid function, and cell lines.  -     CBC and differential; Future; Expected date: 2024  -     Comprehensive metabolic panel; Future; Expected date: 2024  -     TSH, 3rd generation with Free T4 reflex; Future; Expected date: 2024  -     Lipid panel; Future; Expected date: 2024    Crohn's disease of ileum without complication (HCC)  Patient has history of Crohn's disease.  He is well aware of the triggers that cause an exacerbation of symptoms including lactose and eggs.  He has been avoiding these and has thankfully avoided any flares.  He understands that if he does have any complications that follow-up with GI would be recommended.    Mild intermittent asthma without complication  History of mild intermittent asthma.  Currently he is not in any exacerbation, and after quitting vaping this has greatly improved his symptoms.  He has not been using the albuterol as frequently as he was previously, he still has 2 albuterol at home.  If these are used up within 6 months, consider maintenance medication, he was previously on Flovent or Advair.    Cold extremities  Patient has a more recent history of cold extremities.  Will review labs for anemia, electrolyte problems, liver function, kidney function, and thyroid to ensure there is no underlying organic causes for cold extremities.  It may be large doses of caffeine  that might be causing some vasoconstriction and cold extremities.  Patient does have risk factors of previous smoking, and will review for her hypercholesterolemia that may be contributing to vascular complications.  -     CBC and differential; Future; Expected date: 06/07/2024  -     Comprehensive metabolic panel; Future; Expected date: 06/07/2024  -     TSH, 3rd generation with Free T4 reflex; Future; Expected date: 06/07/2024    Immunizations and preventive care screenings were discussed with patient today. Appropriate education was printed on patient's after visit summary.    Counseling:  Alcohol/drug use: discussed moderation in alcohol intake, the recommendations for healthy alcohol use, and avoidance of illicit drug use.  Dental Health: discussed importance of regular tooth brushing, flossing, and dental visits.  Sexual health: discussed sexually transmitted diseases, partner selection, use of condoms, avoidance of unintended pregnancy, and contraceptive alternatives.  Exercise: the importance of regular exercise/physical activity was discussed. Recommend exercise 3-5 times per week for at least 30 minutes.       Depression Screening and Follow-up Plan: Patient was screened for depression during today's encounter. They screened negative with a PHQ-2 score of 0.    Tobacco Cessation Counseling: Tobacco cessation counseling was provided. The patient is sincerely urged to quit consumption of tobacco. He is ready to quit tobacco. Patient is no longer smoking or vaping.  He quit about 1 to 2 years ago with tobacco, recently about 2 or 3 months ago he quit vaping.      History of Present Illness     Adult Annual Physical:  Patient presents for annual physical. Patient presents today to follow-up for physical examination.  He has a longstanding history of Crohn's disease and mild intermittent asthma.  Thankfully he has reduced his vaping and has quit over the last several months, and he does feel that his breathing  is better and he is using the albuterol less.  He does admit to using it about 1-2 times per week.  He remains on Singulair to help with seasonal allergies and asthma.  Patient has had minimal flares for his Crohn's since he has established the foods that typically cause him to flare including lactose and eggs.  He will occasionally eat these in no that there is likely a flare coming, but overall he has not required any follow-up with the GI doctor recently..     Diet and Physical Activity:  - Diet/Nutrition: well balanced diet.  - Exercise: walking. Occasionally will walk    Depression Screening:  - PHQ-2 Score: 0    General Health:  - Sleep: sleeps well and 4-6 hours of sleep on average.  - Hearing: normal hearing bilateral ears.  - Vision: no vision problems.  - Dental: regular dental visits.     Health:  - History of STDs: no.   - Urinary symptoms: none.     Review of Systems   Constitutional:  Negative for chills, fever and unexpected weight change.   HENT:  Negative for congestion, ear discharge, ear pain, hearing loss, postnasal drip, rhinorrhea, sinus pressure, sinus pain and sore throat.    Eyes:  Negative for visual disturbance.   Respiratory:  Positive for chest tightness (intemrittent symptoms with asthma). Negative for cough, shortness of breath and wheezing.    Cardiovascular:  Negative for chest pain and palpitations.   Gastrointestinal:  Positive for constipation (w/ IBS/ Crohns). Negative for abdominal pain, diarrhea, nausea and vomiting.   Genitourinary:  Negative for dysuria and frequency.   Skin:  Negative for rash and wound.   Neurological:  Negative for dizziness, light-headedness and headaches.   Psychiatric/Behavioral:  Negative for self-injury and suicidal ideas.      Medical History Reviewed by provider this encounter:  Tobacco  Allergies  Problems  Surg Hx  Soc Hx      Current Outpatient Medications on File Prior to Visit   Medication Sig Dispense Refill    albuterol (PROVENTIL  "HFA,VENTOLIN HFA) 90 mcg/act inhaler INHALE 2 PUFFS BY MOUTH EVERY 6 HOURS AS NEEDED FOR WHEEZING 6.7 g 0    montelukast (SINGULAIR) 10 mg tablet TAKE 1 TABLET BY MOUTH ONCE DAILY AT BEDTIME 90 tablet 1    [DISCONTINUED] ciprofloxacin-dexamethasone (CIPRODEX) otic suspension Administer 4 drops to the right ear 2 (two) times a day 7.5 mL 0     No current facility-administered medications on file prior to visit.      Social History     Tobacco Use    Smoking status: Former     Types: Cigarettes    Smokeless tobacco: Not on file    Tobacco comments:     1-2 years, 1 ppd    Vaping Use    Vaping status: Former    Substances: Nicotine   Substance and Sexual Activity    Alcohol use: No    Drug use: Not Currently     Types: Marijuana    Sexual activity: Yes     Partners: Female     Birth control/protection: OCP       Objective     /72   Pulse 66   Ht 5' 7\" (1.702 m)   Wt 83 kg (183 lb)   SpO2 98%   BMI 28.66 kg/m²     Physical Exam  Constitutional:       General: He is not in acute distress.     Appearance: Normal appearance. He is normal weight. He is not ill-appearing, toxic-appearing or diaphoretic.   HENT:      Head: Normocephalic and atraumatic.      Right Ear: Tympanic membrane, ear canal and external ear normal. There is no impacted cerumen.      Left Ear: Tympanic membrane, ear canal and external ear normal. There is no impacted cerumen.      Nose: Nose normal. No congestion or rhinorrhea.      Mouth/Throat:      Mouth: Mucous membranes are moist.      Pharynx: Oropharynx is clear. No oropharyngeal exudate or posterior oropharyngeal erythema.   Eyes:      General:         Right eye: No discharge.         Left eye: No discharge.      Pupils: Pupils are equal, round, and reactive to light.   Cardiovascular:      Rate and Rhythm: Normal rate and regular rhythm.      Heart sounds: Normal heart sounds. No murmur heard.     No friction rub. No gallop.   Pulmonary:      Effort: Pulmonary effort is normal. No " respiratory distress.      Breath sounds: Normal breath sounds. No stridor. No wheezing, rhonchi or rales.   Abdominal:      General: Bowel sounds are normal. There is no distension.      Palpations: Abdomen is soft.      Tenderness: There is no abdominal tenderness.   Musculoskeletal:      Cervical back: Neck supple. No tenderness.   Lymphadenopathy:      Cervical: No cervical adenopathy.   Skin:     General: Skin is warm.      Capillary Refill: Capillary refill takes less than 2 seconds.   Neurological:      Mental Status: He is alert.      Sensory: No sensory deficit (Light touch present in all 4 extremities).      Motor: No weakness (5/5 in all 4 extremities).      Deep Tendon Reflexes: Reflexes abnormal (Mild reduction in reflexes in lower extremities.  Upper extremities within normal limits at 2/4.  Lower extremities in the S1 and L4 reflexes 1/4.).

## 2024-06-27 DIAGNOSIS — R06.2 WHEEZING: ICD-10-CM

## 2024-06-27 DIAGNOSIS — J45.21 MILD INTERMITTENT ASTHMA WITH ACUTE EXACERBATION: ICD-10-CM

## 2024-06-27 RX ORDER — MONTELUKAST SODIUM 10 MG/1
10 TABLET ORAL
Qty: 90 TABLET | Refills: 1 | Status: SHIPPED | OUTPATIENT
Start: 2024-06-27

## 2024-10-25 ENCOUNTER — OFFICE VISIT (OUTPATIENT)
Dept: URGENT CARE | Facility: CLINIC | Age: 24
End: 2024-10-25
Payer: COMMERCIAL

## 2024-10-25 VITALS
DIASTOLIC BLOOD PRESSURE: 69 MMHG | SYSTOLIC BLOOD PRESSURE: 129 MMHG | TEMPERATURE: 98.5 F | BODY MASS INDEX: 26.68 KG/M2 | OXYGEN SATURATION: 99 % | WEIGHT: 170 LBS | RESPIRATION RATE: 16 BRPM | HEIGHT: 67 IN | HEART RATE: 78 BPM

## 2024-10-25 DIAGNOSIS — J02.9 ACUTE PHARYNGITIS, UNSPECIFIED ETIOLOGY: Primary | ICD-10-CM

## 2024-10-25 PROCEDURE — S9083 URGENT CARE CENTER GLOBAL: HCPCS | Performed by: NURSE PRACTITIONER

## 2024-10-25 PROCEDURE — 87070 CULTURE OTHR SPECIMN AEROBIC: CPT | Performed by: NURSE PRACTITIONER

## 2024-10-25 PROCEDURE — G0382 LEV 3 HOSP TYPE B ED VISIT: HCPCS | Performed by: NURSE PRACTITIONER

## 2024-10-25 PROCEDURE — 87880 STREP A ASSAY W/OPTIC: CPT | Performed by: NURSE PRACTITIONER

## 2024-10-25 NOTE — PROGRESS NOTES
"  Assessment/Plan    Acute pharyngitis, unspecified etiology [J02.9]  1. Acute pharyngitis, unspecified etiology  POCT rapid ANTIGEN strepA    Throat culture    Throat culture        Centor score was 1  Rapid strep was negative, throat culture pending send out   Increase dosing of OTC NSAIDs to q6-8hrs for pain, can alternate between NSAIDs and tylenol if needed   Trial OTC throat lozenges   Salt-water gargles PRN   F/u with PCP within 3-5 days  Proceed to ER should symptoms worsen      Patient ID: Diaz Albert is a 23 y.o. male.      Reason For Visit / Chief Complaint  Chief Complaint   Patient presents with    Sore Throat     Pt reports that he noticed on Monday if felt like he swallows a pills and his throat is scratchy. He reports that if he doesn't swallow he feels better.          22 y/o male presents for throat pain since Monday which has slightly improved but he still has a difficult time swallowing. He denies chills, fevers, headache, nausea, vomiting or rash. Recent exposure to girlfriends mom who was sick. Patient has been taking 400 mg of motrin HS.           Past Medical History:   Diagnosis Date    Asthma        Past Surgical History:   Procedure Laterality Date    HERNIA REPAIR         Family History   Problem Relation Age of Onset    Prostate cancer Maternal Uncle        Review of Systems   Constitutional: Negative.    HENT:  Positive for sore throat and trouble swallowing.    Eyes: Negative.    Respiratory: Negative.     Cardiovascular: Negative.    Gastrointestinal: Negative.    Endocrine: Negative.    Genitourinary: Negative.    Musculoskeletal: Negative.    Skin: Negative.    Allergic/Immunologic: Negative.    Neurological: Negative.    Hematological: Negative.    Psychiatric/Behavioral: Negative.         Objective:    /69   Pulse 78   Temp 98.5 °F (36.9 °C)   Resp 16   Ht 5' 7\" (1.702 m)   Wt 77.1 kg (170 lb)   SpO2 99%   BMI 26.63 kg/m²     Physical Exam  Constitutional:       " General: He is not in acute distress.     Appearance: He is well-developed. He is not toxic-appearing.   HENT:      Head: Normocephalic and atraumatic.      Right Ear: Tympanic membrane and ear canal normal. Tympanic membrane is not erythematous.      Left Ear: Tympanic membrane and ear canal normal. Tympanic membrane is not erythematous.      Nose: No congestion or rhinorrhea.      Mouth/Throat:      Mouth: Mucous membranes are moist.      Pharynx: Oropharynx is clear. Uvula midline. Posterior oropharyngeal erythema present. No pharyngeal swelling, oropharyngeal exudate or uvula swelling.      Tonsils: 1+ on the right. 1+ on the left.   Eyes:      Conjunctiva/sclera: Conjunctivae normal.   Cardiovascular:      Rate and Rhythm: Normal rate and regular rhythm.      Heart sounds: Normal heart sounds.   Pulmonary:      Effort: Pulmonary effort is normal.   Abdominal:      Palpations: Abdomen is soft.   Musculoskeletal:      Cervical back: Normal range of motion.   Skin:     General: Skin is warm and dry.      Capillary Refill: Capillary refill takes less than 2 seconds.   Neurological:      General: No focal deficit present.      Mental Status: He is alert and oriented to person, place, and time.   Psychiatric:         Mood and Affect: Mood normal.         Behavior: Behavior normal.

## 2024-10-25 NOTE — PATIENT INSTRUCTIONS
Centor score was 1  Rapid strep was negative, throat culture pending send out   Increase dosing of OTC NSAIDs to q6-8hrs for pain, can alternate between NSAIDs and tylenol if needed   Trial OTC throat lozenges   Salt-water gargles PRN   F/u with PCP within 3-5 days  Proceed to ER should symptoms worsen

## 2024-10-27 LAB — BACTERIA THROAT CULT: NORMAL

## 2024-10-28 LAB — BACTERIA THROAT CULT: NORMAL

## 2024-11-06 ENCOUNTER — OFFICE VISIT (OUTPATIENT)
Dept: FAMILY MEDICINE CLINIC | Facility: CLINIC | Age: 24
End: 2024-11-06

## 2024-11-06 VITALS
WEIGHT: 184 LBS | HEIGHT: 67 IN | HEART RATE: 76 BPM | DIASTOLIC BLOOD PRESSURE: 72 MMHG | OXYGEN SATURATION: 99 % | BODY MASS INDEX: 28.88 KG/M2 | SYSTOLIC BLOOD PRESSURE: 122 MMHG

## 2024-11-06 DIAGNOSIS — J02.9 PHARYNGITIS, UNSPECIFIED ETIOLOGY: Primary | ICD-10-CM

## 2024-11-06 DIAGNOSIS — J02.9 SORE THROAT: ICD-10-CM

## 2024-11-06 DIAGNOSIS — J35.1 ENLARGED TONSILS: ICD-10-CM

## 2024-11-06 DIAGNOSIS — J45.21 MILD INTERMITTENT ASTHMA WITH ACUTE EXACERBATION: ICD-10-CM

## 2024-11-06 DIAGNOSIS — R06.2 WHEEZING: ICD-10-CM

## 2024-11-06 DIAGNOSIS — R09.A2 GLOBUS SENSATION: ICD-10-CM

## 2024-11-06 RX ORDER — MONTELUKAST SODIUM 10 MG/1
10 TABLET ORAL
Qty: 90 TABLET | Refills: 2 | Status: SHIPPED | OUTPATIENT
Start: 2024-11-06

## 2024-11-06 NOTE — PROGRESS NOTES
Outpatient Note- Follow up     HPI:     Diaz Albert , 23 y.o. male  presents today for enlarged tonsils and pharyngitis.  The patient about a month ago was seen by urgent care[facility] and was told he had enlarged tonsils.  They swabbed him that time for strep a and was negative for both swab and culture.  They mentioned that if this was strep B, that it should run its course in several weeks.  He has been taking ibuprofen over-the-counter to help with the swelling and pain in the back of the throat, he has recently been trying to taper off of the medication on a daily basis, but has found that his tonsils have increased in size again.  He does feel that there is some globus sensation/the sensation of something being stuck in the back of the throat.  Patient was previously a smoker/vaping, but he discontinued this about 8 months ago, therefore it is unlikely irritation from the use.  He does have a significant history of asthma.  He has been taking his Singulair appropriately, but does need a refill today.  He does not feel that there is any issue with his asthma currently.  He has not required any albuterol recently.    Past Medical History:   Diagnosis Date    Asthma       ROS:   Review of Systems   Constitutional:  Negative for chills and fever.   HENT:  Positive for sore throat. Negative for congestion, ear discharge, ear pain, postnasal drip, rhinorrhea, sinus pressure and sinus pain.    Respiratory:  Negative for cough, chest tightness and shortness of breath.    Cardiovascular:  Negative for chest pain and palpitations.   Gastrointestinal:  Positive for constipation (associated with Crohn's). Negative for abdominal pain, diarrhea, nausea and vomiting.   Skin:  Negative for rash.   Neurological:  Negative for dizziness, light-headedness and headaches.      OBJECTIVE  Vitals:    11/06/24 1200   BP: 122/72   Pulse: 76   SpO2: 99%        Physical Exam  Constitutional:       General: He is not in acute  distress.     Appearance: Normal appearance. He is normal weight. He is not ill-appearing, toxic-appearing or diaphoretic.   HENT:      Head: Normocephalic and atraumatic.      Right Ear: Tympanic membrane, ear canal and external ear normal. There is no impacted cerumen.      Left Ear: Tympanic membrane, ear canal and external ear normal. There is no impacted cerumen.      Ears:      Comments: Mild serous effusion bilaterally     Nose: Congestion present. No rhinorrhea.      Mouth/Throat:      Mouth: Mucous membranes are moist.      Pharynx: Oropharynx is clear. Posterior oropharyngeal erythema (irritated throat and mildly enlarged tonsils.) present. No oropharyngeal exudate.   Eyes:      General:         Right eye: No discharge.         Left eye: No discharge.      Pupils: Pupils are equal, round, and reactive to light.   Cardiovascular:      Rate and Rhythm: Normal rate and regular rhythm.      Heart sounds: Normal heart sounds. No murmur heard.     No friction rub. No gallop.   Pulmonary:      Effort: Pulmonary effort is normal. No respiratory distress.      Breath sounds: Normal breath sounds. No stridor. No wheezing, rhonchi or rales.   Abdominal:      General: Bowel sounds are normal. There is no distension.      Palpations: Abdomen is soft.      Tenderness: There is no abdominal tenderness.   Musculoskeletal:      Cervical back: No tenderness.   Lymphadenopathy:      Cervical: No cervical adenopathy.   Skin:     General: Skin is warm.      Capillary Refill: Capillary refill takes less than 2 seconds.   Neurological:      Mental Status: He is alert.            ASSESSMENT AND PLAN   Diaz was seen today for follow-up.  Diagnoses and all orders for this visit:    Pharyngitis, unspecified etiology  Enlarged tonsils  Sore throat  Globus sensation  Patient presents today with continued sore throat, globus sensation, and enlarged tonsils.  Since it has been over 1 month, I recommended that we empirically treat the  patient with Augmentin which should be able to cover for multiple bacteria.  The patient should take this for 7 days, if it does not improve his symptoms or the swelling then we may need to consider following up with a reflux medication or referral over to the ENT for further evaluation.  Once antibiotic has been completed, I should be able to reach out to him next week to follow-up.  -     amoxicillin-clavulanate (AUGMENTIN) 875-125 mg per tablet; Take 1 tablet by mouth every 12 (twelve) hours for 7 days    Wheezing  Mild intermittent asthma with acute exacerbation  Patient has a history of mild intermittent asthma.  He may have some mild exacerbation with current sore throat and inflamed tonsils.  He is to continue to take albuterol as needed.  The Singulair is to help with allergy symptoms.  He requires a refill, orders placed today.  -     montelukast (SINGULAIR) 10 mg tablet; Take 1 tablet (10 mg total) by mouth daily at bedtime        Tano Anthony DO  [facility]  11/6/2024 12:50 PM

## 2025-04-13 ENCOUNTER — OFFICE VISIT (OUTPATIENT)
Dept: URGENT CARE | Facility: CLINIC | Age: 25
End: 2025-04-13
Payer: COMMERCIAL

## 2025-04-13 VITALS
OXYGEN SATURATION: 100 % | HEART RATE: 69 BPM | DIASTOLIC BLOOD PRESSURE: 82 MMHG | WEIGHT: 189 LBS | BODY MASS INDEX: 29.6 KG/M2 | SYSTOLIC BLOOD PRESSURE: 140 MMHG | RESPIRATION RATE: 16 BRPM | TEMPERATURE: 97.9 F

## 2025-04-13 DIAGNOSIS — L03.039 PARONYCHIA OF GREAT TOE: Primary | ICD-10-CM

## 2025-04-13 PROCEDURE — 99203 OFFICE O/P NEW LOW 30 MIN: CPT | Performed by: PHYSICIAN ASSISTANT

## 2025-04-13 RX ORDER — CEPHALEXIN 500 MG/1
500 CAPSULE ORAL EVERY 8 HOURS SCHEDULED
Qty: 15 CAPSULE | Refills: 0 | Status: SHIPPED | OUTPATIENT
Start: 2025-04-13 | End: 2025-04-18

## 2025-04-13 NOTE — PROGRESS NOTES
"  [facility]        NAME: Diaz Albert is a 24 y.o. male  : 2000    MRN: [id number]  DATE: 2025  TIME: 11:55 AM    Assessment and Plan   Paronychia of great toe [L03.039]  1. Paronychia of great toe  cephalexin (KEFLEX) 500 mg capsule    Ambulatory Referral to Podiatry        Encouraged warm soaks with epsom salt, advil/tylenol for discomfort. Encouraged wearing shoes with a wide toe box. Discussed strict return to care precautions as well as red flag symptoms which should prompt immediate ED referral. Pt verbalized understanding and is in agreement with plan.  Please follow up with your primary care provider within the next week. Please remember that your visit today was with an urgent care provider and should not replace follow up with your primary care provider for chronic medical issues or annual physicals.       Patient Instructions       Follow up with PCP in 3-5 days.  Proceed to  ER if symptoms worsen.    If tests are performed, our office will contact you with results only if changes need to made to the care plan discussed with you at the visit. You can review your full results on [facility] Mychart.    Chief Complaint     Chief Complaint   Patient presents with    Ingrown Toenail     Reports \"in groin\" toe nail on the left foot that was first noticed about 1 month ago. They cut the nail and using peroxide. Reports significant improvement but still persisting.         History of Present Illness       Pt is a 25 yo male with pmh asthma pw L great toe pain x ~1 month. States he had an ingrown toenail and tried to cut it out on its own but it has been getting progressively more painful, swollen, and erythematous. Draining purulent material. No fevers or streaking. No h/o same.        Review of Systems   Review of Systems   Constitutional:  Negative for chills and fever.   Respiratory:  Negative for shortness of breath.    Cardiovascular:  Negative for chest pain. "   Gastrointestinal:  Negative for nausea and vomiting.   Skin:  Positive for color change. Negative for wound.   Neurological:  Negative for weakness and numbness.         Current Medications       Current Outpatient Medications:     albuterol (PROVENTIL HFA,VENTOLIN HFA) 90 mcg/act inhaler, INHALE 2 PUFFS BY MOUTH EVERY 6 HOURS AS NEEDED FOR WHEEZING, Disp: 6.7 g, Rfl: 0    cephalexin (KEFLEX) 500 mg capsule, Take 1 capsule (500 mg total) by mouth every 8 (eight) hours for 5 days, Disp: 15 capsule, Rfl: 0    montelukast (SINGULAIR) 10 mg tablet, Take 1 tablet (10 mg total) by mouth daily at bedtime (Patient not taking: Reported on 4/13/2025), Disp: 90 tablet, Rfl: 2    Current Allergies     Allergies as of 04/13/2025    (No Known Allergies)            The following portions of the patient's history were reviewed and updated as appropriate: allergies, current medications, past family history, past medical history, past social history, past surgical history and problem list.     Past Medical History:   Diagnosis Date    Asthma        Past Surgical History:   Procedure Laterality Date    HERNIA REPAIR         Family History   Problem Relation Age of Onset    Prostate cancer Maternal Uncle          Medications have been verified.        Objective   /82   Pulse 69   Temp 97.9 °F (36.6 °C) (Tympanic)   Resp 16   Wt 85.7 kg (189 lb)   SpO2 100%   BMI 29.60 kg/m²        Physical Exam     Physical Exam  Vitals and nursing note reviewed.   Constitutional:       General: He is not in acute distress.     Appearance: Normal appearance. He is not ill-appearing.   HENT:      Head: Normocephalic and atraumatic.   Cardiovascular:      Rate and Rhythm: Normal rate.   Pulmonary:      Effort: Pulmonary effort is normal. No respiratory distress.   Skin:     General: Skin is warm and dry.      Capillary Refill: Capillary refill takes less than 2 seconds.      Findings: Erythema (L great toe erythematous and tender. Medial  nail is swollen with purulent drainage although no fluctuance felt) present.   Neurological:      Mental Status: He is alert and oriented to person, place, and time.   Psychiatric:         Behavior: Behavior normal.

## 2025-04-25 ENCOUNTER — OFFICE VISIT (OUTPATIENT)
Age: 25
End: 2025-04-25
Attending: PHYSICIAN ASSISTANT
Payer: COMMERCIAL

## 2025-04-25 DIAGNOSIS — L03.039 PARONYCHIA OF GREAT TOE: ICD-10-CM

## 2025-04-25 DIAGNOSIS — L60.0 INGROWN TOENAIL OF LEFT FOOT: Primary | ICD-10-CM

## 2025-04-25 PROCEDURE — 11730 AVULSION NAIL PLATE SIMPLE 1: CPT | Performed by: STUDENT IN AN ORGANIZED HEALTH CARE EDUCATION/TRAINING PROGRAM

## 2025-04-25 PROCEDURE — 99203 OFFICE O/P NEW LOW 30 MIN: CPT | Performed by: STUDENT IN AN ORGANIZED HEALTH CARE EDUCATION/TRAINING PROGRAM

## 2025-04-27 NOTE — PROGRESS NOTES
"[facility] Office Visit    ASSESSMENT     Diagnoses and all orders for this visit:    Ingrown toenail of left foot  -     Nail removal  -     Nail removal    Paronychia of great toe  -     Ambulatory Referral to Podiatry         Problem List Items Addressed This Visit    None  Visit Diagnoses         Ingrown toenail of left foot    -  Primary    Relevant Orders    Nail removal    Nail removal      Paronychia of great toe              PLAN  -Nail avulsion performed as below:  -Nail bed was dressed with bacitracin, DSD, 1 inch Coban  -Patient instructed to take bandage off in 24 hours, wash area lightly with warm soap and water, dry area thoroughly, apply bacitracin and Band-Aid daily x10 days.  -Patient instructed to call our office for an earlier appointment should any extreme pain, redness, swelling, purulent drainage present.    Nail removal    Date/Time: 4/25/2025 9:45 AM    Performed by: Cesar Tovar DPM  Authorized by: Cesar Tovar DPM    Patient location:  Clinic  Indications / Diagnosis:  Ingrown toenail  Universal Protocol:  procedure performed by consultantConsent: Verbal consent obtained.  Risks and benefits: risks, benefits and alternatives were discussed  Consent given by: patient  Time out: Immediately prior to procedure a \"time out\" was called to verify the correct patient, procedure, equipment, support staff and site/side marked as required.  Patient understanding: patient states understanding of the procedure being performed  Site marked: the operative site was marked  Patient identity confirmed: verbally with patient    Location:     Foot:  L big toe  Pre-procedure details:     Skin preparation:  Alcohol and Betadine  Anesthesia (see MAR for exact dosages):     Anesthesia method:  Local infiltration    Local anesthetic:  Lidocaine 1% w/o epi  Nail Removal:     Nail removed:  Partial    Nail side:  Medial    Nail bed sutured: no    Ingrown nail:     Wedge excision " "of skin: no      Nail matrix removed or ablated:  None  Post-procedure details:     Dressing:  4x4 sterile gauze, antibiotic ointment and gauze roll    Patient tolerance of procedure:  Tolerated well, no immediate complications  Nail removal    Date/Time: 4/25/2025 9:45 AM    Performed by: Cesar Tovar DPM  Authorized by: Cesar Tovar DPM    Patient location:  Clinic  Indications / Diagnosis:  Ingrown toenail  Universal Protocol:  procedure performed by consultantConsent: Verbal consent obtained.  Risks and benefits: risks, benefits and alternatives were discussed  Consent given by: patient  Time out: Immediately prior to procedure a \"time out\" was called to verify the correct patient, procedure, equipment, support staff and site/side marked as required.  Patient understanding: patient states understanding of the procedure being performed  Site marked: the operative site was marked  Patient identity confirmed: verbally with patient    Location:     Foot:  L big toe  Pre-procedure details:     Skin preparation:  Alcohol and Betadine  Anesthesia (see MAR for exact dosages):     Anesthesia method:  Local infiltration    Local anesthetic:  Lidocaine 1% w/o epi  Nail Removal:     Nail removed:  Partial    Nail side:  Lateral    Nail bed sutured: no    Ingrown nail:     Wedge excision of skin: no      Nail matrix removed or ablated:  None  Post-procedure details:     Dressing:  4x4 sterile gauze, antibiotic ointment and gauze roll    Patient tolerance of procedure:  Tolerated well, no immediate complications         SUBJECTIVE    Chief Complaint:  Left big toenail pain     Patient ID: Diaz Albert     April 25, 2025: Diaz is a pleasant 24-year-old male who presents today for evaluation of his left big toe.  He states that it is been painful for a few weeks.  He has attempted to cut the nail out himself, however this was unfortunately unsuccessful.  He states that it causes him pain especially if he bumps the " area.        The following portions of the patient's history were reviewed and updated as appropriate: allergies, current medications, past family history, past medical history, past social history, past surgical history and problem list.    Review of Systems   Constitutional: Negative.    Respiratory: Negative.     Cardiovascular: Negative.    Gastrointestinal: Negative.    Genitourinary: Negative.    Musculoskeletal: Negative.    Skin: Negative.          OBJECTIVE      There were no vitals taken for this visit.       Physical Exam  Constitutional:       Appearance: Normal appearance.   HENT:      Head: Normocephalic and atraumatic.   Eyes:      General:         Right eye: No discharge.         Left eye: No discharge.   Cardiovascular:      Rate and Rhythm: Normal rate and regular rhythm.      Pulses:           Dorsalis pedis pulses are 2+ on the right side and 2+ on the left side.        Posterior tibial pulses are 2+ on the right side and 2+ on the left side.   Pulmonary:      Effort: Pulmonary effort is normal.      Breath sounds: Normal breath sounds.   Feet:      Comments: Erythema, edema, mild serous drainage noted to the medial and lateral aspect of the left hallux along with pain on palpation.  This is consistent with onychocryptosis.  Skin:     General: Skin is warm.      Capillary Refill: Capillary refill takes less than 2 seconds.   Neurological:      Mental Status: He is alert and oriented to person, place, and time.      Sensory: Sensation is intact. No sensory deficit.   Psychiatric:         Mood and Affect: Mood normal.

## 2025-05-29 ENCOUNTER — OFFICE VISIT (OUTPATIENT)
Dept: URGENT CARE | Facility: CLINIC | Age: 25
End: 2025-05-29
Payer: COMMERCIAL

## 2025-05-29 ENCOUNTER — TELEPHONE (OUTPATIENT)
Age: 25
End: 2025-05-29

## 2025-05-29 VITALS
WEIGHT: 184 LBS | DIASTOLIC BLOOD PRESSURE: 74 MMHG | HEART RATE: 77 BPM | SYSTOLIC BLOOD PRESSURE: 122 MMHG | TEMPERATURE: 98.2 F | OXYGEN SATURATION: 98 % | RESPIRATION RATE: 18 BRPM | BODY MASS INDEX: 28.88 KG/M2 | HEIGHT: 67 IN

## 2025-05-29 DIAGNOSIS — L81.9 DISCOLORATION OF SKIN OF TOE: Primary | ICD-10-CM

## 2025-05-29 PROCEDURE — 99213 OFFICE O/P EST LOW 20 MIN: CPT | Performed by: PHYSICIAN ASSISTANT

## 2025-05-29 NOTE — PROGRESS NOTES
"  [facility]        NAME: Diaz Albert is a 24 y.o. male  : 2000    MRN: [id number]  DATE: May 29, 2025  TIME: 3:37 PM    Assessment and Plan   Discoloration of skin of toe [L81.9]  1. Discoloration of skin of toe              Follow back up with podiatry     Patient Instructions   There are no Patient Instructions on file for this visit.      Follow up with PCP in 3-5 days.  Proceed to  ER if symptoms worsen.    Chief Complaint     Chief Complaint   Patient presents with    Toe Pain     Was seen at podiatry for ingrown  big toe nail 1 month ago. Took the nail out and the toe is still discolored. No pain          History of Present Illness       The pt is a 24-year-old M presenting today for discoloration of his left great toe x 1 month.  Reports he had an ingrown toenail removed 1 month ago and has had a discoloration since then.  Denies pain or trouble ambulating.        Review of Systems   Review of Systems   Skin:  Positive for color change.         Current Medications     Current Medications[1]    Current Allergies     Allergies as of 2025    (No Known Allergies)            The following portions of the patient's history were reviewed and updated as appropriate: allergies, current medications, past family history, past medical history, past social history, past surgical history and problem list.     Past Medical History[2]    Past Surgical History[3]    Family History[4]      Medications have been verified.        Objective   /74   Pulse 77   Temp 98.2 °F (36.8 °C)   Resp 18   Ht 5' 7\" (1.702 m)   Wt 83.5 kg (184 lb)   SpO2 98%   BMI 28.82 kg/m²        Physical Exam     Physical Exam  Vitals and nursing note reviewed.   Constitutional:       General: He is not in acute distress.     Appearance: Normal appearance. He is normal weight. He is not ill-appearing, toxic-appearing or diaphoretic.     Cardiovascular:      Rate and Rhythm: Normal rate.   Pulmonary:      Effort: " Pulmonary effort is normal.     Musculoskeletal:        Feet:    Feet:      Comments: Ecchymosis of L great toe    Neurological:      Mental Status: He is alert.                        [1]   Current Outpatient Medications:     albuterol (PROVENTIL HFA,VENTOLIN HFA) 90 mcg/act inhaler, INHALE 2 PUFFS BY MOUTH EVERY 6 HOURS AS NEEDED FOR WHEEZING, Disp: 6.7 g, Rfl: 0    montelukast (SINGULAIR) 10 mg tablet, Take 1 tablet (10 mg total) by mouth daily at bedtime (Patient not taking: Reported on 4/13/2025), Disp: 90 tablet, Rfl: 2  [2]   Past Medical History:  Diagnosis Date    Asthma    [3]   Past Surgical History:  Procedure Laterality Date    HERNIA REPAIR     [4]   Family History  Problem Relation Name Age of Onset    Prostate cancer Maternal Uncle

## 2025-05-29 NOTE — TELEPHONE ENCOUNTER
Caller: Diaz Albert    Doctor and/or Office: Dr. Tovar/[place]    #: [phone]    Escalation: Last office visit Patients ingrown that was removed last month is red and purple. He states its been that way since he got it removed. He would like to know if this is normal or should he schedule an appt? Please call and advise. Thank you

## 2025-06-13 ENCOUNTER — OFFICE VISIT (OUTPATIENT)
Dept: FAMILY MEDICINE CLINIC | Facility: CLINIC | Age: 25
End: 2025-06-13
Payer: COMMERCIAL

## 2025-06-13 VITALS
HEIGHT: 67 IN | WEIGHT: 181 LBS | HEART RATE: 62 BPM | BODY MASS INDEX: 28.41 KG/M2 | SYSTOLIC BLOOD PRESSURE: 122 MMHG | DIASTOLIC BLOOD PRESSURE: 82 MMHG | OXYGEN SATURATION: 100 %

## 2025-06-13 DIAGNOSIS — Z13.1 SCREENING FOR DIABETES MELLITUS (DM): ICD-10-CM

## 2025-06-13 DIAGNOSIS — H92.02 REFERRED OTALGIA OF LEFT EAR: ICD-10-CM

## 2025-06-13 DIAGNOSIS — J45.21 MILD INTERMITTENT ASTHMA WITH ACUTE EXACERBATION: ICD-10-CM

## 2025-06-13 DIAGNOSIS — M26.609 TMJ DYSFUNCTION: ICD-10-CM

## 2025-06-13 DIAGNOSIS — Z11.3 SCREENING FOR STDS (SEXUALLY TRANSMITTED DISEASES): ICD-10-CM

## 2025-06-13 DIAGNOSIS — Z13.220 SCREENING CHOLESTEROL LEVEL: ICD-10-CM

## 2025-06-13 DIAGNOSIS — Z00.00 ENCOUNTER FOR PREVENTATIVE ADULT HEALTH CARE EXAMINATION: Primary | ICD-10-CM

## 2025-06-13 DIAGNOSIS — R06.2 WHEEZING: ICD-10-CM

## 2025-06-13 DIAGNOSIS — Z13.0 SCREENING FOR DEFICIENCY ANEMIA: ICD-10-CM

## 2025-06-13 DIAGNOSIS — H60.542 ACUTE ECZEMATOID OTITIS EXTERNA OF LEFT EAR: ICD-10-CM

## 2025-06-13 PROCEDURE — 99395 PREV VISIT EST AGE 18-39: CPT | Performed by: FAMILY MEDICINE

## 2025-06-13 RX ORDER — MELOXICAM 15 MG/1
15 TABLET ORAL DAILY
Qty: 15 TABLET | Refills: 0 | Status: SHIPPED | OUTPATIENT
Start: 2025-06-13

## 2025-06-13 RX ORDER — ALBUTEROL SULFATE 90 UG/1
2 INHALANT RESPIRATORY (INHALATION) EVERY 4 HOURS PRN
Qty: 18 G | Refills: 1 | Status: SHIPPED | OUTPATIENT
Start: 2025-06-13

## 2025-06-13 RX ORDER — NEOMYCIN SULFATE, POLYMYXIN B SULFATE AND HYDROCORTISONE 10; 3.5; 1 MG/ML; MG/ML; [USP'U]/ML
4 SUSPENSION/ DROPS AURICULAR (OTIC) EVERY 8 HOURS SCHEDULED
Qty: 10 ML | Refills: 0 | Status: SHIPPED | OUTPATIENT
Start: 2025-06-13

## 2025-06-13 RX ORDER — MONTELUKAST SODIUM 10 MG/1
10 TABLET ORAL
Qty: 90 TABLET | Refills: 1 | Status: SHIPPED | OUTPATIENT
Start: 2025-06-13

## 2025-06-13 NOTE — ASSESSMENT & PLAN NOTE
Patient has history of mild intermittent asthma.  He has no exacerbation at this time, but will send over albuterol sodas at the pharmacy if he does have any increased symptoms with allergies or infection.  He is not taking Singulair dearly, but new prescription has been sent over to make sure that he has it if he requires it.  Orders:    albuterol (PROVENTIL HFA,VENTOLIN HFA) 90 mcg/act inhaler; Inhale 2 puffs every 4 (four) hours as needed for wheezing    montelukast (SINGULAIR) 10 mg tablet; Take 1 tablet (10 mg total) by mouth daily at bedtime

## 2025-06-13 NOTE — PROGRESS NOTES
Adult Annual Physical  Name: Diaz Albert      : 2000      MRN: [id number]  Encounter Provider: Tano Anthony DO  Encounter Date: 2025   Encounter department: [facility]    :  Assessment & Plan  Wheezing  Mild intermittent asthma with acute exacerbation  Patient has history of mild intermittent asthma.  He has no exacerbation at this time, but will send over albuterol sodas at the pharmacy if he does have any increased symptoms with allergies or infection.  He is not taking Singulair dearly, but new prescription has been sent over to make sure that he has it if he requires it.  Orders:    albuterol (PROVENTIL HFA,VENTOLIN HFA) 90 mcg/act inhaler; Inhale 2 puffs every 4 (four) hours as needed for wheezing    montelukast (SINGULAIR) 10 mg tablet; Take 1 tablet (10 mg total) by mouth daily at bedtime    Encounter for preventative adult health care examination  Screening for deficiency anemia  Screening for diabetes mellitus (DM)  Screening cholesterol level  Screening for STDs (sexually transmitted diseases)  Returns today for physical exam.  Overall he is doing well outside of the ear pain and discomfort.  Will obtain follow-up labs reviewing for STDs, kidney function, liver function, electrolytes, fasting sugar, cholesterol, and blood counts.    Orders:    CBC and differential; Future    Comprehensive metabolic panel; Future    Lipid panel; Future    RPR-Syphilis Screening (Total Syphilis IGG/IGM); Future    HIV 1/2 AB/AG w Reflex [facility] for 2 yr old and above; Future    Hepatitis B surface antigen; Future    Chlamydia/GC amplified DNA by PCR; Future    Trichomonas Vaginalis, GLORIA; Future    Acute eczematoid otitis externa of left ear  Referred otalgia of left ear  TMJ dysfunction  Patient is likely having multiple issues associated with the ear.  There may be some TMJ dysfunction with clicking and cracking at the level of the temporomandibular joint.  He also has been  chewing gum frequently.  He has decreased this since the pain and discomfort, and it is mildly improved.  Will treat the TMJ with Mobic/meloxicam 15 mg daily with food.  This should help decrease the inflammation and improve pain.  Additionally patient is to use Corticosporin in the ear.  There may be some eczematous otitis externa versus early otitis externa from sticking something in his ear.  The antibiotic portion will help to decrease the risk for infection.  Orders:    neomycin-polymyxin-hydrocortisone (CORTISPORIN) 0.35%-10,000 units/mL-1% otic suspension; Administer 4 drops into the left ear every 8 (eight) hours    meloxicam (Mobic) 15 mg tablet; Take 1 tablet (15 mg total) by mouth daily        Preventive Screenings:  - Diabetes Screening: risks/benefits discussed and orders placed  - Cholesterol Screening: risks/benefits discussed and orders placed   - HIV screening: risks/benefits discussed and patient agrees to screening   - Colon cancer screening: screening not indicated   - Lung cancer screening: screening not indicated   - Prostate cancer screening: screening not indicated     Immunizations:  - Immunizations due: Prevnar 20         History of Present Illness     Adult Annual Physical:  Patient presents for annual physical. Patient presents today for annual physical.  His main concern on presentation is increased pain and discomfort starting about 1 to 2 weeks ago.  The pain and discomfort that he is experiencing affects both the jaw and ear.  He feels that it affects the canal and also the area in front of the ear with the jaw opening and closing.  He denies any fever or chills.  He does have a history of seasonal allergies/allergies in general and intermittently takes Singulair for it.  He has not experienced any recent upper respiratory symptoms..     Depression Screening:  - PHQ-2 Score: 0    Review of Systems   Constitutional:  Negative for chills, fever and unexpected weight change.   HENT:   "Positive for ear pain (cocticosporin). Negative for congestion, ear discharge, hearing loss, postnasal drip, rhinorrhea, sinus pressure, sinus pain and sore throat.    Eyes:  Negative for visual disturbance.   Respiratory:  Negative for cough, chest tightness and shortness of breath.    Cardiovascular:  Negative for chest pain and palpitations.   Gastrointestinal:  Positive for constipation (sometimes, crohns). Negative for abdominal pain, diarrhea, nausea and vomiting.   Genitourinary:  Negative for dysuria and frequency.   Neurological:  Negative for dizziness, light-headedness and headaches.   Psychiatric/Behavioral:  Negative for self-injury and suicidal ideas.          Medical History Reviewed by provider this encounter:  Tobacco  Allergies  Meds  Problems  Med Hx  Surg Hx  Fam Hx  Soc   Hx    .  Medications Ordered Prior to Encounter[1]   Social History[2]    Objective   /82   Pulse 62   Ht 5' 7\" (1.702 m)   Wt 82.1 kg (181 lb)   SpO2 100%   BMI 28.35 kg/m²     Physical Exam  Constitutional:       General: He is not in acute distress.     Appearance: Normal appearance. He is normal weight. He is not ill-appearing, toxic-appearing or diaphoretic.   HENT:      Head: Normocephalic and atraumatic.      Right Ear: Tympanic membrane, ear canal and external ear normal.      Left Ear: Tympanic membrane and external ear normal.      Ears:      Comments: TMJ area clicking with tenderness to palpation.  Mild irritation to canal with redness and eczematous findings.      Nose: Nose normal. No congestion or rhinorrhea.      Mouth/Throat:      Mouth: Mucous membranes are moist.      Pharynx: Oropharynx is clear. No oropharyngeal exudate or posterior oropharyngeal erythema.     Eyes:      General:         Right eye: No discharge.         Left eye: No discharge.      Pupils: Pupils are equal, round, and reactive to light.       Cardiovascular:      Rate and Rhythm: Normal rate and regular rhythm.      Heart " sounds: Normal heart sounds. No murmur heard.     No friction rub. No gallop.   Abdominal:      General: There is no distension.      Tenderness: There is no abdominal tenderness.     Skin:     General: Skin is warm.      Capillary Refill: Capillary refill takes less than 2 seconds.     Neurological:      Mental Status: He is alert.      Motor: No weakness (5/5 in all 4 extremities).      Deep Tendon Reflexes: Reflexes normal (2/4, intact, C5, C6, L4, S1).              [1]   Current Outpatient Medications on File Prior to Visit   Medication Sig Dispense Refill    [DISCONTINUED] albuterol (PROVENTIL HFA,VENTOLIN HFA) 90 mcg/act inhaler INHALE 2 PUFFS BY MOUTH EVERY 6 HOURS AS NEEDED FOR WHEEZING 6.7 g 0    [DISCONTINUED] montelukast (SINGULAIR) 10 mg tablet Take 1 tablet (10 mg total) by mouth daily at bedtime (Patient not taking: Reported on 6/13/2025) 90 tablet 2     No current facility-administered medications on file prior to visit.   [2]   Social History  Tobacco Use    Smoking status: Former     Types: Cigarettes    Tobacco comments:     1ppd, 2.5- 3 years   Vaping Use    Vaping status: Former    Substances: Nicotine   Substance and Sexual Activity    Alcohol use: No     Comment: intermittently    Drug use: Not Currently     Types: Marijuana    Sexual activity: Yes     Partners: Female     Birth control/protection: Condom Male

## 2025-06-18 ENCOUNTER — OFFICE VISIT (OUTPATIENT)
Age: 25
End: 2025-06-18
Payer: COMMERCIAL

## 2025-06-18 VITALS — HEIGHT: 67 IN | BODY MASS INDEX: 28.41 KG/M2 | WEIGHT: 181 LBS

## 2025-06-18 DIAGNOSIS — L60.0 INGROWN TOENAIL OF LEFT FOOT: Primary | ICD-10-CM

## 2025-06-18 PROCEDURE — 99212 OFFICE O/P EST SF 10 MIN: CPT | Performed by: STUDENT IN AN ORGANIZED HEALTH CARE EDUCATION/TRAINING PROGRAM

## 2025-06-18 NOTE — LETTER
June 18, 2025     Patient: Diaz Albert  YOB: 2000  Date of Visit: 6/18/2025      To Whom it May Concern:    Diaz Albert is under my professional care. Diaz was seen in my office on 6/18/2025.    If you have any questions or concerns, please don't hesitate to call.         Sincerely,          Cesar Tovar DPM        CC: No Recipients

## 2025-06-18 NOTE — PROGRESS NOTES
"[facility] Office Visit    ASSESSMENT     Diagnoses and all orders for this visit:    Ingrown toenail of left foot           Problem List Items Addressed This Visit    None  Visit Diagnoses         Ingrown toenail of left foot    -  Primary            PLAN  Diaz and I discussed his left foot, there are no complications with his ingrown toenail site.  No additional treatment necessary      SUBJECTIVE    Chief Complaint:  Left big toenail pain     Patient ID: Diaz Albert     April 25, 2025: Diaz is a pleasant 24-year-old male who presents today for evaluation of his left big toe.  He states that it is been painful for a few weeks.  He has attempted to cut the nail out himself, however this was unfortunately unsuccessful.  He states that it causes him pain especially if he bumps the area.    6/18/2025: Diaz presents today for reevaluation of his left great toenail. He states for a few weeks after his nail avulsion his toe became very red and purple. It was painful at one point but has recently improved. He would like to have it checked out to make sure everything has been healing correctly.       The following portions of the patient's history were reviewed and updated as appropriate: allergies, current medications, past family history, past medical history, past social history, past surgical history and problem list.    Review of Systems   Constitutional: Negative.    Respiratory: Negative.     Cardiovascular: Negative.    Gastrointestinal: Negative.    Genitourinary: Negative.    Musculoskeletal: Negative.    Skin: Negative.          OBJECTIVE      Ht 5' 7\" (1.702 m)   Wt 82.1 kg (181 lb)   BMI 28.35 kg/m²        Physical Exam  Constitutional:       Appearance: Normal appearance.   HENT:      Head: Normocephalic and atraumatic.     Eyes:      General:         Right eye: No discharge.         Left eye: No discharge.       Cardiovascular:      Rate and Rhythm: Normal rate and regular " rhythm.      Pulses:           Dorsalis pedis pulses are 2+ on the right side and 2+ on the left side.        Posterior tibial pulses are 2+ on the right side and 2+ on the left side.   Pulmonary:      Effort: Pulmonary effort is normal.      Breath sounds: Normal breath sounds.   Feet:      Comments: Left hallux is normal without complication    Skin:     General: Skin is warm.      Capillary Refill: Capillary refill takes less than 2 seconds.     Neurological:      Mental Status: He is alert and oriented to person, place, and time.      Sensory: Sensation is intact. No sensory deficit.     Psychiatric:         Mood and Affect: Mood normal.